# Patient Record
Sex: MALE | URBAN - METROPOLITAN AREA
[De-identification: names, ages, dates, MRNs, and addresses within clinical notes are randomized per-mention and may not be internally consistent; named-entity substitution may affect disease eponyms.]

---

## 2021-02-02 ENCOUNTER — APPOINTMENT (OUTPATIENT)
Dept: URBAN - METROPOLITAN AREA CLINIC 282 | Age: 36
Setting detail: DERMATOLOGY
End: 2021-02-03

## 2021-02-02 DIAGNOSIS — L738 OTHER SPECIFIED DISEASES OF HAIR AND HAIR FOLLICLES: ICD-10-CM

## 2021-02-02 DIAGNOSIS — L72.8 OTHER FOLLICULAR CYSTS OF THE SKIN AND SUBCUTANEOUS TISSUE: ICD-10-CM

## 2021-02-02 DIAGNOSIS — L663 OTHER SPECIFIED DISEASES OF HAIR AND HAIR FOLLICLES: ICD-10-CM

## 2021-02-02 PROBLEM — L02.02 FURUNCLE OF FACE: Status: ACTIVE | Noted: 2021-02-02

## 2021-02-02 PROCEDURE — OTHER COUNSELING: OTHER

## 2021-02-02 PROCEDURE — OTHER DEFER: OTHER

## 2021-02-02 PROCEDURE — OTHER MIPS QUALITY: OTHER

## 2021-02-02 PROCEDURE — 11900 INJECT SKIN LESIONS </W 7: CPT

## 2021-02-02 PROCEDURE — 10060 I&D ABSCESS SIMPLE/SINGLE: CPT

## 2021-02-02 PROCEDURE — OTHER SEPARATE AND IDENTIFIABLE DOCUMENTATION: OTHER

## 2021-02-02 PROCEDURE — OTHER INCISION AND DRAINAGE: OTHER

## 2021-02-02 PROCEDURE — OTHER INTRALESIONAL KENALOG: OTHER

## 2021-02-02 PROCEDURE — 99203 OFFICE O/P NEW LOW 30 MIN: CPT | Mod: 25

## 2021-02-02 PROCEDURE — OTHER PRESCRIPTION: OTHER

## 2021-02-02 RX ORDER — CLINDAMYCIN PHOSPHATE 10 MG/ML
LOTION TOPICAL
Qty: 1 | Refills: 2 | Status: ERX | COMMUNITY
Start: 2021-02-02

## 2021-02-02 RX ORDER — BENZOYL PEROXIDE 50 MG/ML
LIQUID TOPICAL
Qty: 1 | Refills: 2 | Status: ERX | COMMUNITY
Start: 2021-02-02

## 2021-02-02 ASSESSMENT — LOCATION SIMPLE DESCRIPTION DERM
LOCATION SIMPLE: RIGHT CHEEK
LOCATION SIMPLE: LEFT CHEEK
LOCATION SIMPLE: CHIN
LOCATION SIMPLE: RIGHT FOREHEAD

## 2021-02-02 ASSESSMENT — LOCATION ZONE DERM: LOCATION ZONE: FACE

## 2021-02-02 ASSESSMENT — LOCATION DETAILED DESCRIPTION DERM
LOCATION DETAILED: RIGHT SUPERIOR LATERAL BUCCAL CHEEK
LOCATION DETAILED: RIGHT MEDIAL FOREHEAD
LOCATION DETAILED: LEFT CHIN
LOCATION DETAILED: LEFT INFERIOR CENTRAL MALAR CHEEK

## 2021-02-02 NOTE — PROCEDURE: DEFER
Other Procedure: Dr. Neves
Detail Level: Detailed
Introduction Text (Please End With A Colon): The following procedure was deferred: Excision with

## 2021-02-02 NOTE — PROCEDURE: INCISION AND DRAINAGE
Epidermal Closure: simple interrupted
Suture Text: The incision was partially closed with
Curette: No
Epidermal Sutures: 4-0 Ethilon
Dressing: dry sterile dressing
Curette Text (Optional): After the contents were expressed a curette was used to partially remove the cyst wall.
Post-Care Instructions: I reviewed with the patient in detail post-care instructions. Patient should keep wound covered and call the office should any redness, pain, swelling or worsening occur.
Detail Level: Detailed
Lesion Type: Furuncle
Consent was obtained and risks were reviewed including but not limited to delayed wound healing, infection, need for multiple I and D's, and pain.
Wound Care: Petrolatum
Size Of Lesion In Cm (Optional But May Be Required For Some Insurances): 0
Method: lancet
Preparation Text: The area was prepped in the usual clean fashion.

## 2021-02-02 NOTE — PROCEDURE: MIPS QUALITY
Detail Level: Detailed
Quality 110: Preventive Care And Screening: Influenza Immunization: Influenza Immunization not Administered for Documented Reasons.
Quality 226: Preventive Care And Screening: Tobacco Use: Screening And Cessation Intervention: Patient screened for tobacco use, is a smoker AND received Cessation Counseling

## 2021-02-02 NOTE — HPI: PIMPLES (ACNE)
What Type Of Note Output Would You Prefer (Optional)?: Bullet Format
How Severe Is Your Acne?: moderate
Is This A New Presentation, Or A Follow-Up?: Acne
Additional Comments (Use Complete Sentences): Apple cider vinegar Neutrogena face was and apricot scrub